# Patient Record
Sex: FEMALE
[De-identification: names, ages, dates, MRNs, and addresses within clinical notes are randomized per-mention and may not be internally consistent; named-entity substitution may affect disease eponyms.]

---

## 2021-06-27 ENCOUNTER — NURSE TRIAGE (OUTPATIENT)
Dept: OTHER | Facility: CLINIC | Age: 31
End: 2021-06-27

## 2021-06-27 NOTE — TELEPHONE ENCOUNTER
Brief description of triage:  Chest pain/pressure, back, jaw and neck pain    Onset 2 weeks ago  Chest pressure and back pain  Left breast area and back between shoulder blades   Rates 6/10  X 2 weeks comes and goes    Now with chest pressure worse on deep breath jaw pain, base of skull/neck pain, headache that has been constant for past 2 hours. See below assessment    + family hx of CAD, HTN    Personal hx: DM -metformin  LMP last week    Triage indicates for patient to go to ED now    Care advice provided, patient verbalizes understanding; denies any other questions or concerns; instructed to call back for any new or worsening symptoms. This triage is a result of a call to [address] [place] [address]. Please do not respond to the triage nurse through this encounter. Any subsequent communication should be directly with the patient. Reason for Disposition   [1] Chest pain (or \"angina\") comes and goes AND [2] is happening more often (increasing in frequency) or getting worse (increasing in severity)    Answer Assessment - Initial Assessment Questions  1. LOCATION: \"Where does it hurt? \"        Left chest/breast through to back between shoulder blades    2. RADIATION: \"Does the pain go anywhere else? \" (e.g., into neck, jaw, arms, back)     Yes, for the past 2 hours jaw, low neck/base of skull    3. ONSET: \"When did the chest pain begin? \" (Minutes, hours or days)   2 weeks ago, intermittent, progressively worsening, now constant last 2  Hours    4. PATTERN \"Does the pain come and go, or has it been constant since it started? \"  \"Does it get worse with exertion? \"     Constant for past 2 hours    5. DURATION: \"How long does it last\" (e.g., seconds, minutes, hours)    Constant    6. SEVERITY: \"How bad is the pain? \"  (e.g., Scale 1-10; mild, moderate, or severe)  5-6/10      7. CARDIAC RISK FACTORS: \"Do you have any history of heart problems or risk factors for heart disease? \" (e.g., angina, prior heart